# Patient Record
Sex: FEMALE | Race: WHITE
[De-identification: names, ages, dates, MRNs, and addresses within clinical notes are randomized per-mention and may not be internally consistent; named-entity substitution may affect disease eponyms.]

---

## 2017-05-16 ENCOUNTER — HOSPITAL ENCOUNTER (OUTPATIENT)
Dept: HOSPITAL 44 - LAB | Age: 72
End: 2017-05-16
Attending: FAMILY MEDICINE
Payer: MEDICARE

## 2017-05-16 DIAGNOSIS — E78.00: ICD-10-CM

## 2017-05-16 DIAGNOSIS — E03.9: ICD-10-CM

## 2017-05-16 DIAGNOSIS — I10: Primary | ICD-10-CM

## 2017-05-16 LAB
EGFR (AFRICAN): > 60
EGFR (NON-AFRICAN): > 60

## 2017-05-16 PROCEDURE — 84443 ASSAY THYROID STIM HORMONE: CPT

## 2017-05-16 PROCEDURE — 80061 LIPID PANEL: CPT

## 2017-05-16 PROCEDURE — 36415 COLL VENOUS BLD VENIPUNCTURE: CPT

## 2017-05-16 PROCEDURE — 80053 COMPREHEN METABOLIC PANEL: CPT

## 2018-01-29 ENCOUNTER — HOSPITAL ENCOUNTER (OUTPATIENT)
Dept: HOSPITAL 44 - OPSURG | Age: 73
End: 2018-01-29
Attending: INTERNAL MEDICINE
Payer: MEDICARE

## 2018-01-29 DIAGNOSIS — Z12.11: Primary | ICD-10-CM

## 2018-01-29 PROCEDURE — S1016 NON-PVC INTRAVENOUS ADMINIST: HCPCS

## 2018-01-30 NOTE — GI REPORT
REFERRING PHYSICIAN:

Dr. Siddharth Avery

   

GASTROENTEROLOGIST: 

Donald Gerhardt, MD



PROCEDURE MEDICATION:

Propofol as per anesthesia.   



INDICATIONS: 

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  It has been 10 years.
   She denies changes in bowel habits.    No family history of colorectal 
cancer or other cancers in the family.



PROCEDURE PERFORMED: 

Colonoscopy.



PROCEDURE: 

An Olympus video colonoscope was advanced to the rectum and slowly advanced all 
the way to the cecum.  The appendiceal orifice and terminal ileum were normal.  
On slow withdrawal, the cecum, ascending colon, and transverse colon with no 
obvious intraluminal lesions noted.  The descending colon and sigmoid, again, 
with some redundancy.  No obvious intraluminal lesions were noted.  
Retroflexion of the rectum was normal.  Patient tolerated the procedure well. 



FINDINGS: 

Normal colon mucosa to the cecum. 



RECOMMENDATIONS: 

1.    Continue high-fiber diet. 

2.    Consider re-looking at her colon in 10 years unless clinically indicated.





cc:    Dr. Siddharth Avery





[facility]ANDY

## 2018-06-20 ENCOUNTER — HOSPITAL ENCOUNTER (OUTPATIENT)
Dept: HOSPITAL 44 - RAD | Age: 73
End: 2018-06-20
Attending: FAMILY MEDICINE
Payer: MEDICARE

## 2018-06-20 DIAGNOSIS — M85.89: Primary | ICD-10-CM

## 2018-06-20 PROCEDURE — 77080 DXA BONE DENSITY AXIAL: CPT
